# Patient Record
Sex: FEMALE | Race: BLACK OR AFRICAN AMERICAN | NOT HISPANIC OR LATINO | Employment: OTHER | URBAN - METROPOLITAN AREA
[De-identification: names, ages, dates, MRNs, and addresses within clinical notes are randomized per-mention and may not be internally consistent; named-entity substitution may affect disease eponyms.]

---

## 2022-10-24 ENCOUNTER — LAB (OUTPATIENT)
Dept: LAB | Facility: HOSPITAL | Age: 67
End: 2022-10-24

## 2022-10-24 ENCOUNTER — OFFICE VISIT (OUTPATIENT)
Dept: NEUROLOGY | Facility: CLINIC | Age: 67
End: 2022-10-24

## 2022-10-24 VITALS
HEART RATE: 61 BPM | SYSTOLIC BLOOD PRESSURE: 125 MMHG | BODY MASS INDEX: 37.85 KG/M2 | DIASTOLIC BLOOD PRESSURE: 85 MMHG | WEIGHT: 235.5 LBS | OXYGEN SATURATION: 98 % | HEIGHT: 66 IN

## 2022-10-24 DIAGNOSIS — H02.403 PTOSIS OF BOTH UPPER EYELIDS: ICD-10-CM

## 2022-10-24 DIAGNOSIS — H02.403 PTOSIS OF BOTH UPPER EYELIDS: Primary | ICD-10-CM

## 2022-10-24 PROBLEM — Q10.0 CONGENITAL PTOSIS, BILATERAL: Status: ACTIVE | Noted: 2022-10-24

## 2022-10-24 LAB — CK SERPL-CCNC: 273 U/L (ref 20–180)

## 2022-10-24 PROCEDURE — 99244 OFF/OP CNSLTJ NEW/EST MOD 40: CPT | Performed by: PSYCHIATRY & NEUROLOGY

## 2022-10-24 PROCEDURE — 36415 COLL VENOUS BLD VENIPUNCTURE: CPT

## 2022-10-24 PROCEDURE — 83519 RIA NONANTIBODY: CPT

## 2022-10-24 PROCEDURE — 82550 ASSAY OF CK (CPK): CPT

## 2022-10-24 RX ORDER — TOPIRAMATE 100 MG/1
TABLET, FILM COATED ORAL
COMMUNITY
Start: 2022-10-21

## 2022-10-24 RX ORDER — RIMEGEPANT SULFATE 75 MG/75MG
TABLET, ORALLY DISINTEGRATING ORAL
COMMUNITY
Start: 2022-10-11

## 2022-10-24 RX ORDER — HYDROCHLOROTHIAZIDE 12.5 MG/1
12.5 TABLET ORAL DAILY
COMMUNITY
Start: 2022-09-25

## 2022-10-24 RX ORDER — PANTOPRAZOLE SODIUM 40 MG/1
40 TABLET, DELAYED RELEASE ORAL DAILY
COMMUNITY
Start: 2022-09-02

## 2022-10-24 RX ORDER — ROSUVASTATIN CALCIUM 20 MG/1
20 TABLET, COATED ORAL DAILY
COMMUNITY
Start: 2022-09-22

## 2022-10-24 NOTE — PROGRESS NOTES
"CC: Ptosis    HPI:  Lesli Leblanc is a  67 y.o. right-handed -American female who was sent by Bina Pedersen of ophthalmology regarding bilateral ptosis.  The patient also has dry eye syndrome.  The patient states that she has had \"Chinese eyes\" for many years.  In fact she cannot remember when she did not have bilateral ptosis.  She states she has been told it looks worse on the left but she cannot tell any difference.  She denies diplopia.  She has no problems chewing or speaking.  She says she occasionally chokes on water.  The patient did not remember ever being tested for myasthenia but review of care everywhere demonstrates that an acetylcholine receptor antibody panel was done 2/26/2018 and was negative for binding blocking and modulating antibodies.  I was unable to find an Anti-MuSK antibody.  She states that recently her primary Dr. Kendrick checked thyroid tests which were normal.  I do not have those results.    I asked her to look through her photographs and she had a lot of pictures on her phone that are fairly old but she was not able to pull them up.  She did find 1 from 2017 which did continue to demonstrate bilateral ptosis.  She states that her sister has ptosis of 1 eyelid but denies anyone else in the family with ptosis.    She also has history of vertigo as well as migraines in the 2 do not seem related.  She was seen in [facility] by DONNIE Blackburn for migraines and her exam demonstrated \"extraocular eye movements full\".  Presence or absence of ptosis was not mentioned.  The patient also mentions that she had an MRI of the brain about 8 years ago for vertigo and it was negative.  I also do not have results of it.        Past Medical History:   Diagnosis Date   • Cluster headache    • Hypertension    • Migraine    • Sleep apnea          Past Surgical History:   Procedure Laterality Date   • GALLBLADDER SURGERY     • HYSTERECTOMY             Current Outpatient Medications:   •  hydroCHLOROthiazide " (HYDRODIURIL) 12.5 MG tablet, Take 1 tablet by mouth Daily., Disp: , Rfl:   •  linaclotide (LINZESS) 145 MCG capsule capsule, , Disp: , Rfl:   •  Nurtec 75 MG dispersible tablet, DISSOLVE ONE TABLET BY MOUTH AS NEEDED FOR MIGRAINE, Disp: , Rfl:   •  pantoprazole (PROTONIX) 40 MG EC tablet, Take 1 tablet by mouth Daily., Disp: , Rfl:   •  rosuvastatin (CRESTOR) 20 MG tablet, Take 1 tablet by mouth Daily., Disp: , Rfl:   •  topiramate (TOPAMAX) 100 MG tablet, , Disp: , Rfl:       History reviewed. No pertinent family history.      Social History     Socioeconomic History   • Marital status:    Tobacco Use   • Smoking status: Never   • Smokeless tobacco: Never   Vaping Use   • Vaping Use: Never used   Substance and Sexual Activity   • Alcohol use: Yes   • Drug use: Never   • Sexual activity: Defer         Allergies   Allergen Reactions   • Tramadol Unknown - Low Severity         Pain Scale: 3 or 4/10, headache        ROS:  Review of Systems   Constitutional: Negative for chills, fatigue and fever.   HENT: Negative for hearing loss, tinnitus and trouble swallowing.    Eyes: Positive for photophobia, pain, redness, itching and visual disturbance.   Respiratory: Negative for cough, shortness of breath and wheezing.    Cardiovascular: Negative for chest pain, palpitations and leg swelling.   Gastrointestinal: Negative for diarrhea, nausea and vomiting.   Endocrine: Negative for cold intolerance, heat intolerance and polydipsia.   Genitourinary: Negative for decreased urine volume, difficulty urinating and urgency.   Musculoskeletal: Negative for back pain, neck pain and neck stiffness.   Skin: Negative for color change, rash and wound.   Allergic/Immunologic: Positive for environmental allergies. Negative for food allergies and immunocompromised state.   Neurological: Positive for dizziness and headaches. Negative for tremors, seizures, syncope, facial asymmetry, speech difficulty, weakness, light-headedness and  "numbness.   Hematological: Negative for adenopathy. Does not bruise/bleed easily.   Psychiatric/Behavioral: Positive for sleep disturbance. Negative for confusion and decreased concentration. The patient is not nervous/anxious.          I have reviewed and agree with the above ROS completed by the medical assistant.      Physical Exam:  Vitals:    10/24/22 1336   BP: 125/85   BP Location: Left arm   Patient Position: Sitting   Cuff Size: Adult   Pulse: 61   SpO2: 98%   Weight: 107 kg (235 lb 8 oz)   Height: 166.4 cm (65.5\")     Orthostatic BP:    Body mass index is 38.59 kg/m².    Physical Exam  General: M obese -American female no acute distress  HEENT: Normocephalic no evidence of trauma.  Discs not visible due to ptosis  Neck: Supple.  No cervical bruits.  Thyroid seems a bit full without nodule  Heart: Regular rate and rhythm without murmurs  Extremities: No pedal edema.  Radial pulses strong and simultaneous      Neurological Exam:   Mental Status: Awake, alert, oriented to person, place and time.  Conversant without evidence of an affective disorder, thought disorder, delusions or hallucinations.  Attention span and concentration are normal.  HCF: No aphasia, apraxia or dysarthria.  Recent and remote memory intact.  Knowledge  of recent events intact.  CN: I:   II: Visual fields full without left inattention   III, IV, VI: Eye movements intact without nystagmus.  Ptosis quite prominent at 4 to 5 mm which looks symmetric.  She uses her frontalis to help lift her eyelids.  Pupils equal round and reactive to light.   V,VII: Light touch and pinprick intact all 3 divisions of V.  Facial muscles symmetrical.   VIII: Hearing intact to finger rub   IX,X: Soft palate elevates symmetrically   XI: Sternomastoid and trapezius are strong.   XII: Tongue midline without atrophy or fasciculations  Motor: Normal tone and bulk in the upper and lower extremities   Power testing: Full power in all muscles tested although " it required a fair amount of coaching to get maximum effort  Reflexes: Upper extremities: Diffusely hypoactive        Lower extremities: Diffusely hypoactive        Toe signs: Downgoing  Sensory: Light touch: Diffusely intact arms and leg        Pinprick: Diffusely intact arms and legs        Vibration: Intact at the ankles        Position: Intact at the great toes    Cerebellar: Finger-to-nose: Intact           Rapid movement: Intact           Heel-to-shin: Active intact  Gait and Station: Casual walk is mildly broad-based.  She can toe and heel walk.  Tandem walking is abnormal.  No Romberg and no drift    Results:      No results found for: GLUCOSE, BUN, CREATININE, EGFRIFNONA, EGFRIFAFRI, BCR, POTASSIUM, CO2, CALCIUM, PROTENTOTREF, ALBUMIN, LABIL2, BILIRUBIN, AST, ALT    No results found for: WBC, HGB, HCT, MCV, PLT      .No results found for: RPR      No results found for: TSH, M1PUTSU, T2WXXIX, THYROIDAB      No results found for: TFFPYDTM62      No results found for: FOLATE      No results found for: HGBA1C      No results found for: GLUCOSE, BUN, CREATININE, EGFRIFNONA, EGFRIFAFRI, BCR, K, CO2, CALCIUM, PROTENTOTREF, ALBUMIN, LABIL2, BILIRUBIN, AST, ALT      No results found for: WBC, HGB, HCT, MCV, PLT          Assessment:   1.  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  She had been screened for myasthenia gravis and thyroid disease which were negative.  Her exam does not look suspicious for a primary muscle disease such as ocular pharyngeal dystrophy or other ocular myopathy          Plan:  1.  Check CPK  2.  I asked her to hunt for pictures that are 10 to 20 years old.  3.  Anti-MuSK antibody  4.  We discussed Tensilon tests with small risk of bradycardia-patient declined.  Another possibility would be a clinical trial of Mestinon she has GI problems and is reluctant to consider medications of this type.  5.  She was asked to call for results of her blood tests.  The patient  is very interested in trying to get her eyelids operated on.            Time: 60 minutes                        Dictated utilizing Dragon dictation.

## 2022-11-01 LAB — MUSK AB SER IA-ACNC: <1 U/ML

## 2022-11-02 ENCOUNTER — PATIENT ROUNDING (BHMG ONLY) (OUTPATIENT)
Dept: NEUROLOGY | Facility: CLINIC | Age: 67
End: 2022-11-02

## 2022-11-15 ENCOUNTER — TELEPHONE (OUTPATIENT)
Dept: NEUROLOGY | Facility: CLINIC | Age: 67
End: 2022-11-15

## 2022-12-12 NOTE — TELEPHONE ENCOUNTER
Caller: Lesli Leblanc    Relationship: Self    Best call back number:   Telephone Information:   Mobile [phone]         What is the best time to reach you: ANY    Who are you requesting to speak with (clinical staff, provider,  specific staff member): MIA    Do you know the name of the person who called: MIA    What was the call regarding: RESULTS    Do you require a callback: YES PLEASE

## 2022-12-12 NOTE — TELEPHONE ENCOUNTER
Patient was called and she states that the Physician she saw told her the only thing at this time is eye drops that keep the Eye moist and she will go with that for a wile but if it is necessary she will come in earlier ,emelia OJEDA